# Patient Record
Sex: MALE | Race: OTHER | Employment: FULL TIME | URBAN - METROPOLITAN AREA
[De-identification: names, ages, dates, MRNs, and addresses within clinical notes are randomized per-mention and may not be internally consistent; named-entity substitution may affect disease eponyms.]

---

## 2017-04-18 ENCOUNTER — OFFICE VISIT (OUTPATIENT)
Dept: FAMILY MEDICINE CLINIC | Facility: CLINIC | Age: 37
End: 2017-04-18

## 2017-04-18 VITALS
WEIGHT: 218.63 LBS | RESPIRATION RATE: 18 BRPM | SYSTOLIC BLOOD PRESSURE: 220 MMHG | BODY MASS INDEX: 29.61 KG/M2 | HEART RATE: 99 BPM | HEIGHT: 72 IN | DIASTOLIC BLOOD PRESSURE: 140 MMHG | TEMPERATURE: 99 F

## 2017-04-18 DIAGNOSIS — I10 ESSENTIAL HYPERTENSION: Primary | ICD-10-CM

## 2017-04-18 DIAGNOSIS — F41.9 ANXIETY: ICD-10-CM

## 2017-04-18 PROCEDURE — 99212 OFFICE O/P EST SF 10 MIN: CPT | Performed by: FAMILY MEDICINE

## 2017-04-18 PROCEDURE — 99204 OFFICE O/P NEW MOD 45 MIN: CPT | Performed by: FAMILY MEDICINE

## 2017-04-18 RX ORDER — AMLODIPINE BESYLATE 10 MG/1
10 TABLET ORAL DAILY
Qty: 30 TABLET | Refills: 3 | Status: SHIPPED | OUTPATIENT
Start: 2017-04-18 | End: 2017-07-28

## 2017-04-18 NOTE — PROGRESS NOTES
Patient ID: Faye Stover is a 31year old male. HPI  Patient presents with:  Hypertension  Anxiety    His sister has been checking his blood pressures. And he has been high since he is 36years of age. He was on atenolol at one time.   He went to thyromegaly   Cardiovascular: Normal rate, regular rhythm and normal heart sounds. Pulmonary/Chest: Effort normal and breath sounds normal. No respiratory distress. Abdominal: Normal appearance and bowel sounds are normal. There is    no tenderness.

## 2017-07-28 ENCOUNTER — TELEPHONE (OUTPATIENT)
Dept: INTERNAL MEDICINE CLINIC | Facility: CLINIC | Age: 37
End: 2017-07-28

## 2017-07-28 ENCOUNTER — OFFICE VISIT (OUTPATIENT)
Dept: FAMILY MEDICINE CLINIC | Facility: CLINIC | Age: 37
End: 2017-07-28

## 2017-07-28 VITALS
TEMPERATURE: 98 F | WEIGHT: 212 LBS | HEART RATE: 100 BPM | DIASTOLIC BLOOD PRESSURE: 115 MMHG | BODY MASS INDEX: 28.71 KG/M2 | HEIGHT: 72 IN | SYSTOLIC BLOOD PRESSURE: 190 MMHG

## 2017-07-28 DIAGNOSIS — I10 ESSENTIAL HYPERTENSION: Primary | ICD-10-CM

## 2017-07-28 DIAGNOSIS — F41.9 ANXIETY: ICD-10-CM

## 2017-07-28 PROCEDURE — 99212 OFFICE O/P EST SF 10 MIN: CPT | Performed by: FAMILY MEDICINE

## 2017-07-28 PROCEDURE — 99214 OFFICE O/P EST MOD 30 MIN: CPT | Performed by: FAMILY MEDICINE

## 2017-07-28 RX ORDER — AMLODIPINE BESYLATE 10 MG/1
10 TABLET ORAL DAILY
Qty: 30 TABLET | Refills: 3 | Status: SHIPPED | OUTPATIENT
Start: 2017-07-28 | End: 2017-10-30

## 2017-07-28 RX ORDER — METOPROLOL SUCCINATE 25 MG/1
25 TABLET, EXTENDED RELEASE ORAL DAILY
Qty: 30 TABLET | Refills: 2 | Status: SHIPPED | OUTPATIENT
Start: 2017-07-28 | End: 2017-10-22

## 2017-07-28 NOTE — PROGRESS NOTES
Patient ID: Sarahi Hartmann is a 31year old male. HPI  Patient presents with:  Hypertension  Anxiety  He wanted to be worked in today. He states that the Zoloft really did not help.   He states in the past he has been on drugs like Elavil, Lyrica whi °F (36.8 °C)    TempSrc: Oral    Weight: 212 lb (96.2 kg)    Height: 6' (1.829 m)      Physical Exam   Constitutional: Patient is oriented to person, place, and time. Patient appears well-developed and well-nourished.    HENT:   Mouth/Throat: Mucous membran to plan.          Demetrius Niño,   7/28/2017

## 2017-07-28 NOTE — TELEPHONE ENCOUNTER
Actions Requested:   Appointment made for today 7/28/17 with Dr. Valerie Moody for Dr. Vesta Patel is not in the office.   Problem:   Blood pressure   Onset and Timing:   Was seen on 4/18/17 by Dr. Vesta Patel and placed on AmLODIPine Besylate 10 MG Oral Tab  Associated Sympt

## 2017-10-22 DIAGNOSIS — I10 ESSENTIAL HYPERTENSION: ICD-10-CM

## 2017-10-22 DIAGNOSIS — F41.9 ANXIETY: ICD-10-CM

## 2017-10-25 RX ORDER — METOPROLOL SUCCINATE 25 MG/1
TABLET, EXTENDED RELEASE ORAL
Qty: 30 TABLET | Refills: 0 | Status: SHIPPED | OUTPATIENT
Start: 2017-10-25 | End: 2017-11-27

## 2017-10-25 NOTE — TELEPHONE ENCOUNTER
Call center please assist with appt scheduling. Patient needs to schedule office visit for any additional refills.       Signed Prescriptions Disp Refills    METOPROLOL SUCCINATE ER 25 MG Oral Tablet 24 Hr 30 tablet 0      Sig: TAKE 1 TABLET(25 MG) BY MOUTH

## 2017-10-30 ENCOUNTER — OFFICE VISIT (OUTPATIENT)
Dept: FAMILY MEDICINE CLINIC | Facility: CLINIC | Age: 37
End: 2017-10-30

## 2017-10-30 VITALS
HEART RATE: 80 BPM | DIASTOLIC BLOOD PRESSURE: 100 MMHG | TEMPERATURE: 98 F | WEIGHT: 218 LBS | BODY MASS INDEX: 29.53 KG/M2 | HEIGHT: 72 IN | SYSTOLIC BLOOD PRESSURE: 170 MMHG

## 2017-10-30 DIAGNOSIS — I10 ESSENTIAL HYPERTENSION: Primary | ICD-10-CM

## 2017-10-30 DIAGNOSIS — F41.9 ANXIETY: ICD-10-CM

## 2017-10-30 DIAGNOSIS — F41.0 PANIC ATTACKS: ICD-10-CM

## 2017-10-30 PROCEDURE — 99214 OFFICE O/P EST MOD 30 MIN: CPT | Performed by: FAMILY MEDICINE

## 2017-10-30 PROCEDURE — 99212 OFFICE O/P EST SF 10 MIN: CPT | Performed by: FAMILY MEDICINE

## 2017-10-30 RX ORDER — AMLODIPINE BESYLATE 10 MG/1
10 TABLET ORAL DAILY
Qty: 90 TABLET | Refills: 0 | Status: SHIPPED | OUTPATIENT
Start: 2017-10-30 | End: 2018-03-26

## 2017-10-30 RX ORDER — ESCITALOPRAM OXALATE 10 MG/1
10 TABLET ORAL DAILY
Qty: 90 TABLET | Refills: 0 | Status: SHIPPED | OUTPATIENT
Start: 2017-10-30 | End: 2018-07-11

## 2017-10-30 RX ORDER — METOPROLOL SUCCINATE 50 MG/1
50 TABLET, EXTENDED RELEASE ORAL DAILY
Qty: 90 TABLET | Refills: 0 | Status: SHIPPED | OUTPATIENT
Start: 2017-10-30 | End: 2018-03-04

## 2017-10-30 NOTE — PROGRESS NOTES
Patient ID: Connor Guy is a 31year old male. HPI  Patient presents with:  Hypertension    I had seen him in July. I placed him on metoprolol.   He was clearly anxious and his blood pressure was terribly high but that could have been due to the a Exam  Blood pressure (!) 164/103, pulse 80, temperature 97.9 °F (36.6 °C), temperature source Oral, height 6' (1.829 m), weight 218 lb (98.9 kg).    10/30/17  1411 10/30/17  1424   BP: (!) 164/103 (!) 170/100   Pulse: 80    Temp: 97.9 °F (36.6 °C)    TempSr Lexapro. Panic attacks  -     escitalopram 10 MG Oral Tab; Take 1 tablet (10 mg total) by mouth daily. For mood    REFUSES FLU SHOT      Follow up if symptoms persist.  Take medicine (if given) as prescribed.   Approach to treatment discussed and patient/f

## 2017-11-27 DIAGNOSIS — I10 ESSENTIAL HYPERTENSION: ICD-10-CM

## 2017-11-27 DIAGNOSIS — F41.9 ANXIETY: ICD-10-CM

## 2017-11-28 RX ORDER — METOPROLOL SUCCINATE 25 MG/1
TABLET, EXTENDED RELEASE ORAL
Qty: 30 TABLET | Refills: 0 | Status: SHIPPED | OUTPATIENT
Start: 2017-11-28 | End: 2018-07-11

## 2017-11-28 NOTE — TELEPHONE ENCOUNTER
Refill protocol failed because the patient did not meet the protocol criteria.  Please advise in regards to refill request       Hypertensive Medications  Protocol Criteria:  · Appointment scheduled in the past 6 months or in the next 3 months  · BMP or CMP

## 2017-12-25 DIAGNOSIS — I10 ESSENTIAL HYPERTENSION: ICD-10-CM

## 2017-12-27 RX ORDER — AMLODIPINE BESYLATE 10 MG/1
TABLET ORAL
Qty: 30 TABLET | Refills: 0 | OUTPATIENT
Start: 2017-12-27

## 2017-12-28 NOTE — TELEPHONE ENCOUNTER
Spoke with Edd Cline at [facility] and verified patient has Rx ordered 10/30/17 from [facility]-request refused.       Order History   Outpatient   Date/Time Action Taken User Additional Information   10/30/17 1424 Sign Javy Francisco, DO Reorder from Order: [id number]

## 2018-03-04 DIAGNOSIS — F41.9 ANXIETY: ICD-10-CM

## 2018-03-04 DIAGNOSIS — I10 ESSENTIAL HYPERTENSION: ICD-10-CM

## 2018-03-09 RX ORDER — METOPROLOL SUCCINATE 50 MG/1
TABLET, EXTENDED RELEASE ORAL
Qty: 90 TABLET | Refills: 0 | Status: SHIPPED | OUTPATIENT
Start: 2018-03-09 | End: 2018-07-11

## 2018-03-26 DIAGNOSIS — I10 ESSENTIAL HYPERTENSION: ICD-10-CM

## 2018-03-30 RX ORDER — AMLODIPINE BESYLATE 10 MG/1
TABLET ORAL
Qty: 30 TABLET | Refills: 0 | Status: SHIPPED | OUTPATIENT
Start: 2018-03-30 | End: 2018-07-11

## 2018-06-24 ENCOUNTER — HOSPITAL ENCOUNTER (EMERGENCY)
Facility: HOSPITAL | Age: 38
Discharge: HOME | End: 2018-06-24
Attending: EMERGENCY MEDICINE
Payer: COMMERCIAL

## 2018-06-24 ENCOUNTER — HOSPITAL ENCOUNTER (OUTPATIENT)
Age: 38
Discharge: HOME | End: 2018-06-24
Payer: COMMERCIAL

## 2018-06-24 VITALS
OXYGEN SATURATION: 100 % | RESPIRATION RATE: 20 BRPM | SYSTOLIC BLOOD PRESSURE: 232 MMHG | DIASTOLIC BLOOD PRESSURE: 132 MMHG | HEART RATE: 106 BPM | TEMPERATURE: 98 F

## 2018-06-24 VITALS
RESPIRATION RATE: 15 BRPM | HEIGHT: 72 IN | WEIGHT: 200 LBS | OXYGEN SATURATION: 98 % | BODY MASS INDEX: 27.09 KG/M2 | TEMPERATURE: 98 F | SYSTOLIC BLOOD PRESSURE: 174 MMHG | HEART RATE: 77 BPM | DIASTOLIC BLOOD PRESSURE: 106 MMHG

## 2018-06-24 DIAGNOSIS — I16.0 HYPERTENSIVE URGENCY: Primary | ICD-10-CM

## 2018-06-24 PROCEDURE — 85025 COMPLETE CBC W/AUTO DIFF WBC: CPT

## 2018-06-24 PROCEDURE — 99284 EMERGENCY DEPT VISIT MOD MDM: CPT

## 2018-06-24 PROCEDURE — 80048 BASIC METABOLIC PNL TOTAL CA: CPT

## 2018-06-24 PROCEDURE — 80048 BASIC METABOLIC PNL TOTAL CA: CPT | Performed by: EMERGENCY MEDICINE

## 2018-06-24 PROCEDURE — 93010 ELECTROCARDIOGRAM REPORT: CPT

## 2018-06-24 PROCEDURE — 85025 COMPLETE CBC W/AUTO DIFF WBC: CPT | Performed by: EMERGENCY MEDICINE

## 2018-06-24 PROCEDURE — 36415 COLL VENOUS BLD VENIPUNCTURE: CPT

## 2018-06-24 PROCEDURE — 93005 ELECTROCARDIOGRAM TRACING: CPT

## 2018-06-24 PROCEDURE — 93010 ELECTROCARDIOGRAM REPORT: CPT | Performed by: EMERGENCY MEDICINE

## 2018-06-24 PROCEDURE — 99214 OFFICE O/P EST MOD 30 MIN: CPT

## 2018-06-24 PROCEDURE — 93010 ELECTROCARDIOGRAM REPORT: CPT | Performed by: NURSE PRACTITIONER

## 2018-06-24 RX ORDER — LORAZEPAM 1 MG/1
1 TABLET ORAL ONCE
Status: COMPLETED | OUTPATIENT
Start: 2018-06-24 | End: 2018-06-24

## 2018-06-24 RX ORDER — METOPROLOL SUCCINATE 50 MG/1
50 TABLET, EXTENDED RELEASE ORAL DAILY
Qty: 30 TABLET | Refills: 0 | Status: SHIPPED | OUTPATIENT
Start: 2018-06-24 | End: 2018-08-22 | Stop reason: DRUGHIGH

## 2018-06-24 RX ORDER — LISINOPRIL 20 MG/1
20 TABLET ORAL ONCE
Status: COMPLETED | OUTPATIENT
Start: 2018-06-24 | End: 2018-06-24

## 2018-06-24 RX ORDER — LISINOPRIL 20 MG/1
20 TABLET ORAL DAILY
Qty: 30 TABLET | Refills: 0 | Status: SHIPPED | OUTPATIENT
Start: 2018-06-24 | End: 2018-07-11

## 2018-06-24 NOTE — ED NOTES
Patient is here for elevated blood pressure with headaches. Patient has been referred to the ER by Lance Jurado NP for further care but is refusing to go. He states he understands the risks of not going to the ER including death.   Dr. Niharika Youssef has also sp

## 2018-06-24 NOTE — Clinical Note
Date: 6/24/2018  Patient: Asmita Childress  Admitted: 6/24/2018  1:06 PM  Attending Provider: No att. providers found    Asmita Childress or his authorized caregiver has made the decision for the patient to leave the Immediate Care against the advice of  PARMINDER

## 2018-06-24 NOTE — ED PROVIDER NOTES
Patient presents with:  Hypertension (cardiovascular)      HPI:     This 30year old male with a past history of HTN presents with elevated blood pressure as well as headache.  Pt reports he has been out of his HTN medications for the last few months and st patient. See AVS for detailed discharge instructions for your condition today. Follow Up with:  No follow-up provider specified. Disposition:  Transfer to Emergency Department via private car. Emergency Department noted by nursing staff.      I sp

## 2018-06-24 NOTE — ED INITIAL ASSESSMENT (HPI)
Patient states he has a hx of HTN and woke up with a headache today. He has been without medications for months now because he hasn't been in to see his primary doctor to refill them.  Denies any chest pain, sob, nausea, dizziness, vomiting. +Smoker

## 2018-06-24 NOTE — ED PROVIDER NOTES
Patient Seen in: [facility] AND [facility] Emergency Department    History   Patient presents with:  Hypertension (cardiovascular)    Stated Complaint: blood presure high    HPI    Patient is a 46-year-old male who presents to the emergency department with a chi Eyes: Conjunctivae and EOM are normal. Pupils are equal, round, and reactive to light. Fundoscopic exam:       The right eye shows no hemorrhage and no papilledema. The left eye shows no hemorrhage and no papilledema. Neck: Neck supple.    Card several different antidepressants but I am not certain that he stayed on them long enough to actually get a benefit from them. He understands the risks of long-term untreated hypertension and agrees to follow-up at this time.   He has no apparent endorgan

## 2018-07-11 ENCOUNTER — OFFICE VISIT (OUTPATIENT)
Dept: FAMILY MEDICINE CLINIC | Facility: CLINIC | Age: 38
End: 2018-07-11

## 2018-07-11 VITALS
DIASTOLIC BLOOD PRESSURE: 86 MMHG | WEIGHT: 208 LBS | HEIGHT: 72 IN | BODY MASS INDEX: 28.17 KG/M2 | HEART RATE: 87 BPM | SYSTOLIC BLOOD PRESSURE: 136 MMHG

## 2018-07-11 DIAGNOSIS — F41.9 ANXIETY: ICD-10-CM

## 2018-07-11 DIAGNOSIS — I10 ESSENTIAL HYPERTENSION: Primary | ICD-10-CM

## 2018-07-11 PROCEDURE — 99213 OFFICE O/P EST LOW 20 MIN: CPT | Performed by: NURSE PRACTITIONER

## 2018-07-11 RX ORDER — AMLODIPINE BESYLATE 10 MG/1
10 TABLET ORAL DAILY
Qty: 30 TABLET | Refills: 2 | Status: SHIPPED | OUTPATIENT
Start: 2018-07-11

## 2018-07-11 RX ORDER — BUSPIRONE HYDROCHLORIDE 10 MG/1
10 TABLET ORAL 3 TIMES DAILY
Qty: 90 TABLET | Refills: 3 | Status: SHIPPED | OUTPATIENT
Start: 2018-07-11 | End: 2018-08-10

## 2018-07-11 RX ORDER — ALPRAZOLAM 0.25 MG/1
0.25 TABLET ORAL 3 TIMES DAILY PRN
Qty: 30 TABLET | Refills: 0 | Status: SHIPPED | OUTPATIENT
Start: 2018-07-11 | End: 2018-08-22

## 2018-07-11 NOTE — PROGRESS NOTES
HPI  Pt presents with follow up for ER for htn on 6/24/18  Is only taking metoprolol 50 mg ER as lisinopril and citalopram make him dizzy. Has never followed up for labs from last year as ordered by Dr Celine Escudero.  Was out of meds for a couple of weeks and cam None on file     Social History Narrative   None on file         Current Outpatient Prescriptions: AmLODIPine Besylate 10 MG Oral Tab Take 1 tablet (10 mg total) by mouth daily.  Disp: 30 tablet Rfl: 2   BusPIRone HCl 10 MG Oral Tab Take 1 tablet (10 mg

## 2018-07-11 NOTE — PATIENT INSTRUCTIONS
Treating Anxiety Disorders with Medicine  An anxiety disorder can make you feel nervous or apprehensive, even without a clear reason.  In people age 72 and older, generalized anxiety disorder is one of the most commonly diagnosed anxiety disorders. Many t Never use alcohol or other drugs with anti-anxiety medicines. This could result in loss of muscular control, sedation, coma, or death. Also, use only the amount of medicine prescribed for you.  If you think you may have taken too much, get emergency care ri · Sexual problems. Some antidepressants can affect your desire for sex or your ability to have an orgasm. A change in dosage or medicine often solves the problem. If you have a sexual side effect that concerns you, tell your healthcare provider.   · Addicti Cognitive behavioral therapy (CBT) teaches you to manage anxiety. It does this by helping you understand how you think and act when you’re anxious. Research has shown CBT to be a very effective treatment for anxiety disorders.  How CBT is run is almost like · If an anxiety disorder is diagnosed, seek mental healthcare. This is an illness and it can respond to treatment. Most types of anxiety disorders will respond to talk therapy and medicine. · Educate yourself about anxiety disorders.  Keep track of helpful · Limit canned, dried, cured, packaged, and fast foods. These can contain a lot of salt. · Eat 8 to 10 servings of fruits and vegetables every day. · Choose lean meats, fish, or chicken. · Eat whole-grain pasta, brown rice, and beans.   · Eat 2 to 3 serv If lifestyle changes aren’t enough, your healthcare provider may prescribe high blood pressure medicine. Take all medicines as prescribed. If you have any questions about your medicines, ask your healthcare provider before stopping or changing them.    Date

## 2018-07-13 NOTE — ASSESSMENT & PLAN NOTE
Start buspar 1/2-1 10 mg tab bid-tid   Xanax 0.25 mg I po q 8 hrs severe anxiety  con't therapy  Enc behaviors that will promote relaxation

## 2018-07-23 ENCOUNTER — APPOINTMENT (OUTPATIENT)
Dept: LAB | Age: 38
End: 2018-07-23
Attending: FAMILY MEDICINE
Payer: COMMERCIAL

## 2018-08-22 ENCOUNTER — OFFICE VISIT (OUTPATIENT)
Dept: FAMILY MEDICINE CLINIC | Facility: CLINIC | Age: 38
End: 2018-08-22
Payer: COMMERCIAL

## 2018-08-22 VITALS
SYSTOLIC BLOOD PRESSURE: 182 MMHG | DIASTOLIC BLOOD PRESSURE: 100 MMHG | WEIGHT: 206 LBS | HEART RATE: 74 BPM | BODY MASS INDEX: 27.9 KG/M2 | HEIGHT: 72 IN

## 2018-08-22 DIAGNOSIS — F41.9 ANXIETY: ICD-10-CM

## 2018-08-22 DIAGNOSIS — I10 ESSENTIAL HYPERTENSION: Primary | ICD-10-CM

## 2018-08-22 PROCEDURE — 99214 OFFICE O/P EST MOD 30 MIN: CPT | Performed by: NURSE PRACTITIONER

## 2018-08-22 RX ORDER — ALPRAZOLAM 0.25 MG/1
0.25 TABLET ORAL 2 TIMES DAILY PRN
Qty: 20 TABLET | Refills: 0 | Status: SHIPPED | OUTPATIENT
Start: 2018-08-22

## 2018-08-22 RX ORDER — BUSPIRONE HYDROCHLORIDE 10 MG/1
10 TABLET ORAL 3 TIMES DAILY
COMMUNITY

## 2018-08-22 RX ORDER — METOPROLOL SUCCINATE 100 MG/1
100 TABLET, EXTENDED RELEASE ORAL DAILY
Qty: 30 TABLET | Refills: 3 | Status: SHIPPED | OUTPATIENT
Start: 2018-08-22 | End: 2018-12-26

## 2018-08-22 NOTE — PROGRESS NOTES
HPI  Pt here for f/u bp. Denies chest pain, shortness of breath, headache. bp at home 148//92    Father had severe HTN and had severe complications from it-multiple strokes and eventually  from complications of HTN at age 58.      Anxiety is a 0.25 MG Oral Tab Take 1 tablet (0.25 mg total) by mouth 2 (two) times daily as needed for Anxiety. Disp: 20 tablet Rfl: 0   BusPIRone HCl 10 MG Oral Tab Take 10 mg by mouth 3 (three) times daily.  Disp:  Rfl:    AmLODIPine Besylate 10 MG Oral Tab Take 1 tab

## 2018-08-22 NOTE — ASSESSMENT & PLAN NOTE
Refill xanax 0.25mg I po bid prn severe anxiety  Enc pt to use buspar more than once per day to help manage anxiety-lightheadness improves over time

## 2018-08-22 NOTE — ASSESSMENT & PLAN NOTE
con't amlodipine  Increase metorprolol succ ER to 100 mg I po q d  Check bp at home  Low sodium , low fat, low carb diet  Low impact exercise  Refer cardiology

## 2018-12-26 DIAGNOSIS — I10 ESSENTIAL HYPERTENSION: ICD-10-CM

## 2018-12-26 RX ORDER — METOPROLOL SUCCINATE 100 MG/1
TABLET, EXTENDED RELEASE ORAL
Qty: 30 TABLET | Refills: 0 | Status: SHIPPED | OUTPATIENT
Start: 2018-12-26 | End: 2019-01-30

## 2019-01-30 DIAGNOSIS — I10 ESSENTIAL HYPERTENSION: ICD-10-CM

## 2019-01-30 RX ORDER — METOPROLOL SUCCINATE 100 MG/1
TABLET, EXTENDED RELEASE ORAL
Qty: 90 TABLET | Refills: 0 | Status: SHIPPED | OUTPATIENT
Start: 2019-01-30 | End: 2019-05-17

## 2019-01-31 NOTE — TELEPHONE ENCOUNTER
Hypertensive Medications  Protocol Criteria:  · Appointment scheduled in the past 6 months or in the next 3 months  · BMP or CMP in the past 12 months  · Creatinine result < 2  Recent Outpatient Visits            5 months ago Essential hypertension    [facility]

## 2019-05-17 DIAGNOSIS — I10 ESSENTIAL HYPERTENSION: ICD-10-CM

## 2019-05-18 RX ORDER — METOPROLOL SUCCINATE 100 MG/1
TABLET, EXTENDED RELEASE ORAL
Qty: 90 TABLET | Refills: 1 | Status: SHIPPED | OUTPATIENT
Start: 2019-05-18

## 2019-05-18 NOTE — TELEPHONE ENCOUNTER
Please review; protocol failed.     Requested Prescriptions     Pending Prescriptions Disp Refills   • METOPROLOL SUCCINATE  MG Oral Tablet 24 Hr [Pharmacy Med Name: METOPROLOL ER SUCCINATE 100MG TABS] 90 tablet 0     Sig: TAKE 1 TABLET(100 MG) BY JOSE F

## 2023-06-13 NOTE — ED INITIAL ASSESSMENT (HPI)
Sent from Immediate care for evaluation of elevated blood pressure and anxiety. Pt sts he was at a store and checked his blood pressure and it was elevated. sts he also had a headache.   Upon speaking with the pharmacist was advised to go to the immediate This RN stuck patient 3 times. Unable to obtain a line at this time.       Manasa Contreras RN  06/13/23 1923

## (undated) NOTE — MR AVS SNAPSHOT
[address], [address]  [address] [facility]  [phone]               Thank you for choosing us for your health care visit with Caio Chawla DO.   We are glad to serve you and happy to provide you with this summary Follow-up Instructions     Return in about 3 weeks (around 5/9/2017). Scheduling Instructions     Tuesday April 18, 2017     Imaging:  US KIDNEYS ([id number])    Instructions:   To schedule a test at any [facility] information, go to [email or web address] and click on the Sign Up Now link in the [employer] box. Enter your [employer] Activation Code exactly as it appears below along with your Zip Code and Date of Birth to complete the sign-up process.  If you do You don’t need to join a gym. Home exercises work great.  Put more priority on exercise in your life                    Visit [facility] online at  [email or web address]

## (undated) NOTE — ED AVS SNAPSHOT
Jeff Dey   MRN: [id number]    Department:  [facility] Emergency Department   Date of Visit:  6/24/2018           Disclosure     Insurance plans vary and the physician(s) referred by the ER may not be covered by your plan.  Please contact CARE PHYSICIAN AT ONCE OR RETURN IMMEDIATELY TO THE EMERGENCY DEPARTMENT. If you have been prescribed any medication(s), please fill your prescription right away and begin taking the medication(s) as directed.   If you believe that any of the medications

## (undated) NOTE — Clinical Note
2017              Jamal Berger Professor [phone]         To Whom It May Concern,       Rigoberto Eduardo ( 14--) was seen in our office today and is currently under my care.   If you have any questions plea

## (undated) NOTE — LETTER
7/28/2017          To Whom It May Concern:    Caleb Pratt is currently under my medical care and may not return to work at this time. Please excuse Mae Byrd for today. He may return to work on July 29, 2017.    Activity is restricted as follows: non